# Patient Record
Sex: FEMALE | Race: WHITE | NOT HISPANIC OR LATINO | URBAN - METROPOLITAN AREA
[De-identification: names, ages, dates, MRNs, and addresses within clinical notes are randomized per-mention and may not be internally consistent; named-entity substitution may affect disease eponyms.]

---

## 2024-01-29 ENCOUNTER — HOSPITAL ENCOUNTER (OUTPATIENT)
Dept: RADIOLOGY | Facility: EXTERNAL LOCATION | Age: 34
Discharge: HOME | End: 2024-01-29

## 2024-01-29 DIAGNOSIS — M79.641 RIGHT HAND PAIN: ICD-10-CM

## 2024-01-29 DIAGNOSIS — M25.531 RIGHT WRIST PAIN: ICD-10-CM

## 2024-02-07 ENCOUNTER — OFFICE VISIT (OUTPATIENT)
Dept: PRIMARY CARE | Facility: CLINIC | Age: 34
End: 2024-02-07
Payer: MEDICAID

## 2024-02-07 VITALS
WEIGHT: 129 LBS | HEART RATE: 68 BPM | OXYGEN SATURATION: 99 % | DIASTOLIC BLOOD PRESSURE: 68 MMHG | HEIGHT: 66 IN | BODY MASS INDEX: 20.73 KG/M2 | SYSTOLIC BLOOD PRESSURE: 110 MMHG | TEMPERATURE: 98.5 F

## 2024-02-07 DIAGNOSIS — Z13.220 SCREENING CHOLESTEROL LEVEL: ICD-10-CM

## 2024-02-07 DIAGNOSIS — L21.9 SEBORRHEIC DERMATITIS: Primary | ICD-10-CM

## 2024-02-07 DIAGNOSIS — Q79.60 EHLERS-DANLOS SYNDROME (HHS-HCC): ICD-10-CM

## 2024-02-07 PROBLEM — E05.90 HYPERTHYROIDISM: Status: ACTIVE | Noted: 2024-02-07

## 2024-02-07 PROCEDURE — 1036F TOBACCO NON-USER: CPT | Performed by: FAMILY MEDICINE

## 2024-02-07 PROCEDURE — 99203 OFFICE O/P NEW LOW 30 MIN: CPT | Performed by: FAMILY MEDICINE

## 2024-02-07 RX ORDER — CETIRIZINE HYDROCHLORIDE 10 MG/1
TABLET, CHEWABLE ORAL DAILY
COMMUNITY

## 2024-02-07 RX ORDER — MEDROXYPROGESTERONE ACETATE 104 MG/.65ML
104 INJECTION, SUSPENSION SUBCUTANEOUS
COMMUNITY

## 2024-02-07 RX ORDER — CLOBETASOL PROPIONATE 0.05 G/100ML
1 SHAMPOO TOPICAL DAILY
Qty: 118 ML | Refills: 0 | Status: SHIPPED | OUTPATIENT
Start: 2024-02-07

## 2024-02-07 RX ORDER — BISMUTH SUBSALICYLATE 262 MG
1 TABLET,CHEWABLE ORAL DAILY
COMMUNITY

## 2024-02-07 RX ORDER — TALC
500 POWDER (GRAM) TOPICAL AS NEEDED
COMMUNITY

## 2024-02-07 RX ORDER — TRIAMCINOLONE ACETONIDE 1 MG/G
CREAM TOPICAL 2 TIMES DAILY
Qty: 90 G | Refills: 0 | Status: SHIPPED | OUTPATIENT
Start: 2024-02-07

## 2024-02-07 NOTE — PROGRESS NOTES
Assessment     ASSESSMENT/PLAN:      Patient Instructions   1. Seborrheic dermatitis  Try clobetasol shampoo daily for 14 days  Use Kenalog cream on extremities are dry and itchy for twice a day, but only 14 days at a time  - clobetasoL 0.05 % shampoo; Apply 1 Application topically once daily.  Dispense: 118 mL; Refill: 0  - triamcinolone (Kenalog) 0.1 % cream; Apply topically 2 times a day. Apply to affected area 1-2 times daily as needed. Avoid face and groin.  Dispense: 90 g; Refill: 0    2. 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 female who presents today for the following:     POTS/syncope  S/p [employer] loop recorder implant on 9/5/2019, he has been getting routine device checks  Has stopped midodrine  Still experiencing sycope, has been referred to syncope center in [place]    EDS  Follows neurology  Has follow-up appointment 4/2024    Family history     [facility]   Dep  PAP   Preventative  Pap: Obtained by [facility], currently on Depo-Provera for birth control    Review of Systems   Skin:  Positive for rash.       History reviewed. No pertinent past medical history.     Past Surgical History:   Procedure Laterality Date    CARDIAC ASSIST DEVICE INSERTION  09/2019    CARDIAC ASSIST DEVICE INSERTION  2023    Replacement    KNEE SURGERY Left 01/2020    KNEE SURGERY Left 12/2020    KNEE SURGERY Left 2022    PERCUTANEOUS PINNING WRIST FRACTURE Left 03/2020    SHOULDER SURGERY Left 2021        Current Outpatient Medications   Medication Instructions    cetirizine (ZyrTEC) 10 mg chewable tablet oral, Daily    clobetasoL 0.05 % shampoo 1 Application, Topical, Daily    Depo-subQ provera 104 104 mg, subcutaneous, Every 3 months    magnesium oxide (MAG-OX)  "500 mg, oral, As needed    multivitamin tablet 1 tablet, oral, Daily    triamcinolone (Kenalog) 0.1 % cream Topical, 2 times daily, Apply to affected area 1-2 times daily as needed. Avoid face and groin.        Allergies   Allergen Reactions    Peanut Anaphylaxis and Unknown    Mold Other    Codeine Other and GI Upset    Trichophyton Mentagrophytes Allergenic Extract Unknown        Social History     Socioeconomic History    Marital status:      Spouse name: Not on file    Number of children: 3    Years of education: Not on file    Highest education level: Not on file   Occupational History    Occupation: Behavorial Tech     Comment: works [employer]   Tobacco Use    Smoking status: Never    Smokeless tobacco: Never   Substance and Sexual Activity    Alcohol use: Not Currently     Comment: 1 drinks weekly    Drug use: Not Currently    Sexual activity: Not on file   Other Topics Concern    Not on file   Social History Narrative    Not on file     Social Determinants of Health     Financial Resource Strain: Not on file   Food Insecurity: Not on file   Transportation Needs: Not on file   Physical Activity: Not on file   Stress: Not on file   Social Connections: Not on file   Intimate Partner Violence: Not on file   Housing Stability: Not on file        Family History   Problem Relation Name Age of Onset    Graves' disease Mother      Thyroid nodules Mother      Hypertension Mother      Mitral valve prolapse Father      Other (mitral valve) Sister      Thyroid nodules Maternal Grandmother      Heart attack Maternal Grandfather      Hypertension Maternal Grandfather      Aortic aneurysm Paternal Grandmother      Heart attack Paternal Grandfather      Stroke Paternal Grandfather          Objective     OBJECTIVE:     Vitals:    02/07/24 1626   BP: 110/68   Pulse: 68   Temp: 36.9 °C (98.5 °F)   SpO2: 99%   Weight: 58.5 kg (129 lb)   Height: 1.676 m (5' 6\")        Physical Exam  HENT:      Head: Normocephalic " and atraumatic.      Nose: Nose normal.      Mouth/Throat:      Mouth: Mucous membranes are moist.   Eyes:      Pupils: Pupils are equal, round, and reactive to light.   Cardiovascular:      Rate and Rhythm: Normal rate and regular rhythm.      Pulses: Normal pulses.      Heart sounds: No murmur heard.  Pulmonary:      Effort: Pulmonary effort is normal.      Breath sounds: Normal breath sounds.   Abdominal:      Tenderness: There is no abdominal tenderness.   Musculoskeletal:         General: Normal range of motion.      Cervical back: Normal range of motion.   Skin:     General: Skin is warm and dry.      Comments: Try, flaky scalp   Neurological:      Mental Status: She is alert.   Psychiatric:         Mood and Affect: Mood normal.

## 2024-02-08 NOTE — PATIENT INSTRUCTIONS
1. Seborrheic dermatitis  Try clobetasol shampoo daily for 14 days  Use Kenalog cream on extremities are dry and itchy for twice a day, but only 14 days at a time  - clobetasoL 0.05 % shampoo; Apply 1 Application topically once daily.  Dispense: 118 mL; Refill: 0  - triamcinolone (Kenalog) 0.1 % cream; Apply topically 2 times a day. Apply to affected area 1-2 times daily as needed. Avoid face and groin.  Dispense: 90 g; Refill: 0    2. Screening cholesterol level  Lipid panel printed  - Lipid panel; Future  - Lipid panel    3. Virgil-Danlos syndrome  Patient follows up with neurology

## 2024-09-01 ENCOUNTER — APPOINTMENT (OUTPATIENT)
Dept: RADIOLOGY | Facility: HOSPITAL | Age: 34
End: 2024-09-01
Payer: MEDICAID

## 2024-09-01 ENCOUNTER — HOSPITAL ENCOUNTER (EMERGENCY)
Facility: HOSPITAL | Age: 34
Discharge: HOME | End: 2024-09-01
Attending: EMERGENCY MEDICINE
Payer: MEDICAID

## 2024-09-01 VITALS
OXYGEN SATURATION: 100 % | TEMPERATURE: 98.6 F | RESPIRATION RATE: 17 BRPM | DIASTOLIC BLOOD PRESSURE: 74 MMHG | BODY MASS INDEX: 21.66 KG/M2 | SYSTOLIC BLOOD PRESSURE: 110 MMHG | WEIGHT: 130 LBS | HEIGHT: 65 IN | HEART RATE: 67 BPM

## 2024-09-01 DIAGNOSIS — M25.512 ACUTE PAIN OF LEFT SHOULDER: Primary | ICD-10-CM

## 2024-09-01 PROCEDURE — 73030 X-RAY EXAM OF SHOULDER: CPT | Mod: LT

## 2024-09-01 PROCEDURE — 73030 X-RAY EXAM OF SHOULDER: CPT | Mod: LEFT SIDE | Performed by: RADIOLOGY

## 2024-09-01 PROCEDURE — 99283 EMERGENCY DEPT VISIT LOW MDM: CPT

## 2024-09-01 PROCEDURE — 2500000001 HC RX 250 WO HCPCS SELF ADMINISTERED DRUGS (ALT 637 FOR MEDICARE OP): Performed by: EMERGENCY MEDICINE

## 2024-09-01 RX ORDER — CYCLOBENZAPRINE HCL 10 MG
5 TABLET ORAL ONCE
Status: COMPLETED | OUTPATIENT
Start: 2024-09-01 | End: 2024-09-01

## 2024-09-01 ASSESSMENT — PAIN - FUNCTIONAL ASSESSMENT: PAIN_FUNCTIONAL_ASSESSMENT: 0-10

## 2024-09-01 ASSESSMENT — PAIN DESCRIPTION - ORIENTATION: ORIENTATION: LEFT

## 2024-09-01 ASSESSMENT — PAIN SCALES - GENERAL
PAINLEVEL_OUTOF10: 0 - NO PAIN
PAINLEVEL_OUTOF10: 7

## 2024-09-01 ASSESSMENT — LIFESTYLE VARIABLES
EVER HAD A DRINK FIRST THING IN THE MORNING TO STEADY YOUR NERVES TO GET RID OF A HANGOVER: NO
TOTAL SCORE: 0
EVER FELT BAD OR GUILTY ABOUT YOUR DRINKING: NO
HAVE YOU EVER FELT YOU SHOULD CUT DOWN ON YOUR DRINKING: NO
HAVE PEOPLE ANNOYED YOU BY CRITICIZING YOUR DRINKING: NO

## 2024-09-01 ASSESSMENT — COLUMBIA-SUICIDE SEVERITY RATING SCALE - C-SSRS
2. HAVE YOU ACTUALLY HAD ANY THOUGHTS OF KILLING YOURSELF?: NO
6. HAVE YOU EVER DONE ANYTHING, STARTED TO DO ANYTHING, OR PREPARED TO DO ANYTHING TO END YOUR LIFE?: NO
1. IN THE PAST MONTH, HAVE YOU WISHED YOU WERE DEAD OR WISHED YOU COULD GO TO SLEEP AND NOT WAKE UP?: NO

## 2024-09-01 ASSESSMENT — PAIN DESCRIPTION - PAIN TYPE: TYPE: ACUTE PAIN

## 2024-09-01 ASSESSMENT — PAIN DESCRIPTION - LOCATION: LOCATION: SHOULDER

## 2024-09-01 NOTE — Clinical Note
Lindy Sepulveda was seen and treated in our emergency department on 9/1/2024.  She may return to work on 09/04/2024.       If you have any questions or concerns, please don't hesitate to call.      Bigg Wilson MD

## 2024-09-02 NOTE — ED PROVIDER NOTES
Emergency Department Provider Note        History of Present Illness     History provided by: Patient  Limitations to History: None  External Records Reviewed with Brief Summary: None    HPI:  Lindy Sepulveda is a 34 y.o. female presents to the ED for left shoulder pain.  She believes she dislocated her shoulder yesterday.  She reports history of previous shoulder dislocations.  She states it happened while tightening a bolt.  Denies any other injuries.    Physical Exam   Triage vitals:  T 37 °C (98.6 °F)  HR 66  /70  RR 18  O2 100 % None (Room air)    General: Awake, alert, in no acute distress  Eyes: Gaze conjugate.  No scleral icterus or injection  HENT: Normo-cephalic, atraumatic. No stridor  CV: Regular rate, regular rhythm. Radial pulses 2+ bilaterally  Resp: Breathing non-labored, speaking in full sentences.  Clear to auscultation bilaterally  GI: Soft, non-distended, non-tender. No rebound or guarding.  : Deferred  MSK/Extremities: No gross bony deformities. Pain with abduction of LUE  Skin: Warm. Appropriate color  Neuro: Alert. Oriented. Face symmetric. Speech is fluent.  Gross strength and sensation intact in b/l UE and LEs  Psych: Appropriate mood and affect    Medical Decision Making & ED Course   Medical Decision Makin y.o. female presents to the ED for left shoulder pain.  She is current concerned is dislocated.  This might of happened yesterday.  On my examination initially have very low clinical concern for dislocation.  She is able to cross midline.  I did obtain x-ray which shows evidence of possible joint disruption.  No evidence of dislocation on x-ray.  Will recommend orthopedic surgery follow-up.  All questions were answered.  ----      Differential diagnoses considered include but are not limited to: Dislocation, rotator cuff injury     Social Determinants of Health which Significantly Impact Care: None identified     EKG Independent Interpretation: EKG not  obtained    Independent Result Review and Interpretation: Relevant laboratory and radiographic results were reviewed and independently interpreted by myself.  As necessary, they are commented on in the ED Course.    Chronic conditions affecting the patient's care: As documented above in [facility]    The patient was discussed with the following consultants/services: None    Care Considerations: As documented above in [facility]    ED Course:  Diagnoses as of 09/04/24 0236   Acute pain of left shoulder     Disposition   As a result of the work-up, the patient was discharged home.  she was informed of her diagnosis and instructed to come back with any concerns or worsening of condition.  she and was agreeable to the plan as discussed above.  she was given the opportunity to ask questions.  All of the patient's questions were answered.    Procedures   Procedures    Patient was seen independently    Bigg Wilson MD  Emergency Medicine     Bigg Wilson MD  09/04/24 3429

## 2025-02-12 ENCOUNTER — APPOINTMENT (OUTPATIENT)
Dept: PRIMARY CARE | Facility: CLINIC | Age: 35
End: 2025-02-12
Payer: MEDICAID